# Patient Record
Sex: FEMALE | Race: WHITE | NOT HISPANIC OR LATINO
[De-identification: names, ages, dates, MRNs, and addresses within clinical notes are randomized per-mention and may not be internally consistent; named-entity substitution may affect disease eponyms.]

---

## 2018-02-06 ENCOUNTER — RESULT REVIEW (OUTPATIENT)
Age: 38
End: 2018-02-06

## 2018-02-16 ENCOUNTER — APPOINTMENT (OUTPATIENT)
Dept: MAMMOGRAPHY | Facility: CLINIC | Age: 38
End: 2018-02-16
Payer: COMMERCIAL

## 2018-02-16 ENCOUNTER — OUTPATIENT (OUTPATIENT)
Dept: OUTPATIENT SERVICES | Facility: HOSPITAL | Age: 38
LOS: 1 days | End: 2018-02-16
Payer: COMMERCIAL

## 2018-02-16 ENCOUNTER — APPOINTMENT (OUTPATIENT)
Dept: ULTRASOUND IMAGING | Facility: CLINIC | Age: 38
End: 2018-02-16
Payer: COMMERCIAL

## 2018-02-16 DIAGNOSIS — Z00.8 ENCOUNTER FOR OTHER GENERAL EXAMINATION: ICD-10-CM

## 2018-02-16 PROCEDURE — 77066 DX MAMMO INCL CAD BI: CPT | Mod: 26

## 2018-02-16 PROCEDURE — G0279: CPT | Mod: 26

## 2018-02-16 PROCEDURE — G0279: CPT

## 2018-02-16 PROCEDURE — 77066 DX MAMMO INCL CAD BI: CPT

## 2018-02-16 PROCEDURE — 76642 ULTRASOUND BREAST LIMITED: CPT | Mod: 26,RT

## 2018-02-16 PROCEDURE — 76642 ULTRASOUND BREAST LIMITED: CPT

## 2018-03-02 ENCOUNTER — OUTPATIENT (OUTPATIENT)
Dept: OUTPATIENT SERVICES | Facility: HOSPITAL | Age: 38
LOS: 1 days | End: 2018-03-02
Payer: COMMERCIAL

## 2018-03-02 ENCOUNTER — RESULT REVIEW (OUTPATIENT)
Age: 38
End: 2018-03-02

## 2018-03-02 ENCOUNTER — APPOINTMENT (OUTPATIENT)
Dept: ULTRASOUND IMAGING | Facility: CLINIC | Age: 38
End: 2018-03-02
Payer: COMMERCIAL

## 2018-03-02 DIAGNOSIS — Z00.8 ENCOUNTER FOR OTHER GENERAL EXAMINATION: ICD-10-CM

## 2018-03-02 PROCEDURE — 77065 DX MAMMO INCL CAD UNI: CPT | Mod: 26,RT

## 2018-03-02 PROCEDURE — 19083 BX BREAST 1ST LESION US IMAG: CPT

## 2018-03-02 PROCEDURE — 77065 DX MAMMO INCL CAD UNI: CPT

## 2018-03-02 PROCEDURE — A4648: CPT

## 2018-03-02 PROCEDURE — 19083 BX BREAST 1ST LESION US IMAG: CPT | Mod: RT

## 2018-09-13 ENCOUNTER — OUTPATIENT (OUTPATIENT)
Dept: OUTPATIENT SERVICES | Facility: HOSPITAL | Age: 38
LOS: 1 days | End: 2018-09-13
Payer: COMMERCIAL

## 2018-09-13 ENCOUNTER — APPOINTMENT (OUTPATIENT)
Dept: ULTRASOUND IMAGING | Facility: HOSPITAL | Age: 38
End: 2018-09-13

## 2018-09-13 DIAGNOSIS — Z00.8 ENCOUNTER FOR OTHER GENERAL EXAMINATION: ICD-10-CM

## 2018-09-13 PROCEDURE — 76642 ULTRASOUND BREAST LIMITED: CPT | Mod: 26,RT

## 2018-09-13 PROCEDURE — 76642 ULTRASOUND BREAST LIMITED: CPT

## 2019-02-07 ENCOUNTER — RESULT REVIEW (OUTPATIENT)
Age: 39
End: 2019-02-07

## 2019-03-07 ENCOUNTER — APPOINTMENT (OUTPATIENT)
Dept: ULTRASOUND IMAGING | Facility: CLINIC | Age: 39
End: 2019-03-07
Payer: COMMERCIAL

## 2019-03-07 ENCOUNTER — OUTPATIENT (OUTPATIENT)
Dept: OUTPATIENT SERVICES | Facility: HOSPITAL | Age: 39
LOS: 1 days | End: 2019-03-07
Payer: COMMERCIAL

## 2019-03-07 ENCOUNTER — APPOINTMENT (OUTPATIENT)
Dept: MAMMOGRAPHY | Facility: CLINIC | Age: 39
End: 2019-03-07
Payer: COMMERCIAL

## 2019-03-07 DIAGNOSIS — Z00.8 ENCOUNTER FOR OTHER GENERAL EXAMINATION: ICD-10-CM

## 2019-03-07 PROCEDURE — 77063 BREAST TOMOSYNTHESIS BI: CPT | Mod: 26

## 2019-03-07 PROCEDURE — 76641 ULTRASOUND BREAST COMPLETE: CPT | Mod: 26,50

## 2019-03-07 PROCEDURE — 77067 SCR MAMMO BI INCL CAD: CPT | Mod: 26

## 2019-03-07 PROCEDURE — 76641 ULTRASOUND BREAST COMPLETE: CPT

## 2019-03-07 PROCEDURE — 77063 BREAST TOMOSYNTHESIS BI: CPT

## 2019-03-07 PROCEDURE — 77067 SCR MAMMO BI INCL CAD: CPT

## 2019-11-07 ENCOUNTER — APPOINTMENT (OUTPATIENT)
Dept: MRI IMAGING | Facility: HOSPITAL | Age: 39
End: 2019-11-07
Payer: COMMERCIAL

## 2019-11-07 ENCOUNTER — OUTPATIENT (OUTPATIENT)
Dept: OUTPATIENT SERVICES | Facility: HOSPITAL | Age: 39
LOS: 1 days | End: 2019-11-07
Payer: COMMERCIAL

## 2019-11-07 DIAGNOSIS — Z00.8 ENCOUNTER FOR OTHER GENERAL EXAMINATION: ICD-10-CM

## 2019-11-07 PROCEDURE — 72148 MRI LUMBAR SPINE W/O DYE: CPT | Mod: 26

## 2019-11-07 PROCEDURE — 72148 MRI LUMBAR SPINE W/O DYE: CPT

## 2019-11-13 ENCOUNTER — APPOINTMENT (OUTPATIENT)
Dept: ORTHOPEDIC SURGERY | Facility: CLINIC | Age: 39
End: 2019-11-13
Payer: COMMERCIAL

## 2019-11-13 VITALS
SYSTOLIC BLOOD PRESSURE: 121 MMHG | WEIGHT: 148 LBS | HEART RATE: 96 BPM | HEIGHT: 66.5 IN | DIASTOLIC BLOOD PRESSURE: 86 MMHG | BODY MASS INDEX: 23.5 KG/M2

## 2019-11-13 DIAGNOSIS — Z78.9 OTHER SPECIFIED HEALTH STATUS: ICD-10-CM

## 2019-11-13 PROCEDURE — 99204 OFFICE O/P NEW MOD 45 MIN: CPT

## 2019-11-13 RX ORDER — CYCLOBENZAPRINE HYDROCHLORIDE, CAPSAICIN AND MENTHOL PATCH 10MG-.0375
10 & 0.0375-5 KIT MISCELLANEOUS
Refills: 0 | Status: ACTIVE | COMMUNITY

## 2019-11-13 RX ORDER — PREDNISONE 10 MG
TABLET ORAL
Refills: 0 | Status: ACTIVE | COMMUNITY

## 2019-11-19 ENCOUNTER — CHART COPY (OUTPATIENT)
Age: 39
End: 2019-11-19

## 2019-11-26 ENCOUNTER — OUTPATIENT (OUTPATIENT)
Dept: OUTPATIENT SERVICES | Facility: HOSPITAL | Age: 39
LOS: 1 days | End: 2019-11-26
Payer: COMMERCIAL

## 2019-11-26 DIAGNOSIS — M54.16 RADICULOPATHY, LUMBAR REGION: ICD-10-CM

## 2019-11-26 PROCEDURE — 62323 NJX INTERLAMINAR LMBR/SAC: CPT

## 2019-11-26 PROCEDURE — 77003 FLUOROGUIDE FOR SPINE INJECT: CPT

## 2019-12-02 ENCOUNTER — APPOINTMENT (OUTPATIENT)
Dept: ORTHOPEDIC SURGERY | Facility: CLINIC | Age: 39
End: 2019-12-02
Payer: COMMERCIAL

## 2019-12-02 VITALS — WEIGHT: 148 LBS | HEIGHT: 66.5 IN | BODY MASS INDEX: 23.5 KG/M2

## 2019-12-02 PROCEDURE — 99213 OFFICE O/P EST LOW 20 MIN: CPT

## 2020-01-25 ENCOUNTER — TRANSCRIPTION ENCOUNTER (OUTPATIENT)
Age: 40
End: 2020-01-25

## 2020-02-10 ENCOUNTER — RESULT REVIEW (OUTPATIENT)
Age: 40
End: 2020-02-10

## 2020-03-09 ENCOUNTER — APPOINTMENT (OUTPATIENT)
Dept: ORTHOPEDIC SURGERY | Facility: CLINIC | Age: 40
End: 2020-03-09
Payer: COMMERCIAL

## 2020-03-09 VITALS — HEIGHT: 66.5 IN | WEIGHT: 148 LBS | BODY MASS INDEX: 23.5 KG/M2

## 2020-03-09 PROCEDURE — 99213 OFFICE O/P EST LOW 20 MIN: CPT

## 2020-03-12 ENCOUNTER — APPOINTMENT (OUTPATIENT)
Dept: MAMMOGRAPHY | Facility: CLINIC | Age: 40
End: 2020-03-12
Payer: COMMERCIAL

## 2020-03-12 ENCOUNTER — APPOINTMENT (OUTPATIENT)
Dept: ULTRASOUND IMAGING | Facility: CLINIC | Age: 40
End: 2020-03-12
Payer: COMMERCIAL

## 2020-03-12 ENCOUNTER — OUTPATIENT (OUTPATIENT)
Dept: OUTPATIENT SERVICES | Facility: HOSPITAL | Age: 40
LOS: 1 days | End: 2020-03-12
Payer: COMMERCIAL

## 2020-03-12 DIAGNOSIS — Z00.8 ENCOUNTER FOR OTHER GENERAL EXAMINATION: ICD-10-CM

## 2020-03-12 PROCEDURE — 77063 BREAST TOMOSYNTHESIS BI: CPT | Mod: 26

## 2020-03-12 PROCEDURE — 76641 ULTRASOUND BREAST COMPLETE: CPT | Mod: 26,50

## 2020-03-12 PROCEDURE — 77067 SCR MAMMO BI INCL CAD: CPT

## 2020-03-12 PROCEDURE — 77063 BREAST TOMOSYNTHESIS BI: CPT

## 2020-03-12 PROCEDURE — 76641 ULTRASOUND BREAST COMPLETE: CPT

## 2020-03-12 PROCEDURE — 77067 SCR MAMMO BI INCL CAD: CPT | Mod: 26

## 2020-04-25 ENCOUNTER — MESSAGE (OUTPATIENT)
Age: 40
End: 2020-04-25

## 2020-05-04 LAB
SARS-COV-2 IGG SERPL IA-ACNC: 0.3 RATIO
SARS-COV-2 IGG SERPL QL IA: NEGATIVE

## 2020-07-28 ENCOUNTER — OUTPATIENT (OUTPATIENT)
Dept: OUTPATIENT SERVICES | Facility: HOSPITAL | Age: 40
LOS: 1 days | End: 2020-07-28
Payer: COMMERCIAL

## 2020-07-28 DIAGNOSIS — Z11.59 ENCOUNTER FOR SCREENING FOR OTHER VIRAL DISEASES: ICD-10-CM

## 2020-07-28 PROCEDURE — U0003: CPT

## 2020-07-29 LAB — SARS-COV-2 RNA SPEC QL NAA+PROBE: SIGNIFICANT CHANGE UP

## 2020-07-30 ENCOUNTER — OUTPATIENT (OUTPATIENT)
Dept: OUTPATIENT SERVICES | Facility: HOSPITAL | Age: 40
LOS: 1 days | End: 2020-07-30
Payer: COMMERCIAL

## 2020-07-30 DIAGNOSIS — M54.16 RADICULOPATHY, LUMBAR REGION: ICD-10-CM

## 2020-07-30 PROCEDURE — 62323 NJX INTERLAMINAR LMBR/SAC: CPT

## 2020-07-30 PROCEDURE — 77003 FLUOROGUIDE FOR SPINE INJECT: CPT

## 2020-08-17 ENCOUNTER — APPOINTMENT (OUTPATIENT)
Dept: ORTHOPEDIC SURGERY | Facility: CLINIC | Age: 40
End: 2020-08-17
Payer: COMMERCIAL

## 2020-08-17 VITALS — WEIGHT: 48 LBS | BODY MASS INDEX: 7.62 KG/M2 | TEMPERATURE: 97.4 F | HEIGHT: 66.5 IN

## 2020-08-17 DIAGNOSIS — M51.26 OTHER INTERVERTEBRAL DISC DISPLACEMENT, LUMBAR REGION: ICD-10-CM

## 2020-08-17 DIAGNOSIS — M48.061 SPINAL STENOSIS, LUMBAR REGION WITHOUT NEUROGENIC CLAUDICATION: ICD-10-CM

## 2020-08-17 PROCEDURE — 99214 OFFICE O/P EST MOD 30 MIN: CPT

## 2020-08-17 RX ORDER — CYCLOBENZAPRINE HYDROCHLORIDE 10 MG/1
10 TABLET, FILM COATED ORAL
Qty: 10 | Refills: 0 | Status: ACTIVE | COMMUNITY
Start: 2020-07-05

## 2020-08-17 RX ORDER — PREDNISONE 20 MG/1
20 TABLET ORAL
Qty: 5 | Refills: 0 | Status: ACTIVE | COMMUNITY
Start: 2020-07-05

## 2020-09-12 ENCOUNTER — OUTPATIENT (OUTPATIENT)
Dept: OUTPATIENT SERVICES | Facility: HOSPITAL | Age: 40
LOS: 1 days | End: 2020-09-12
Payer: COMMERCIAL

## 2020-09-12 DIAGNOSIS — Z11.59 ENCOUNTER FOR SCREENING FOR OTHER VIRAL DISEASES: ICD-10-CM

## 2020-09-12 LAB — SARS-COV-2 RNA SPEC QL NAA+PROBE: SIGNIFICANT CHANGE UP

## 2020-09-12 PROCEDURE — U0003: CPT

## 2020-09-14 ENCOUNTER — OUTPATIENT (OUTPATIENT)
Dept: OUTPATIENT SERVICES | Facility: HOSPITAL | Age: 40
LOS: 1 days | End: 2020-09-14
Payer: COMMERCIAL

## 2020-09-14 DIAGNOSIS — M54.16 RADICULOPATHY, LUMBAR REGION: ICD-10-CM

## 2020-09-14 PROCEDURE — 62323 NJX INTERLAMINAR LMBR/SAC: CPT

## 2020-09-14 PROCEDURE — 77003 FLUOROGUIDE FOR SPINE INJECT: CPT

## 2021-02-25 ENCOUNTER — RESULT REVIEW (OUTPATIENT)
Age: 41
End: 2021-02-25

## 2021-04-05 ENCOUNTER — APPOINTMENT (OUTPATIENT)
Dept: MRI IMAGING | Facility: HOSPITAL | Age: 41
End: 2021-04-05
Payer: COMMERCIAL

## 2021-04-05 ENCOUNTER — RESULT REVIEW (OUTPATIENT)
Age: 41
End: 2021-04-05

## 2021-04-05 ENCOUNTER — OUTPATIENT (OUTPATIENT)
Dept: OUTPATIENT SERVICES | Facility: HOSPITAL | Age: 41
LOS: 1 days | End: 2021-04-05
Payer: COMMERCIAL

## 2021-04-05 DIAGNOSIS — Z00.8 ENCOUNTER FOR OTHER GENERAL EXAMINATION: ICD-10-CM

## 2021-04-05 PROCEDURE — 72148 MRI LUMBAR SPINE W/O DYE: CPT

## 2021-04-05 PROCEDURE — 72148 MRI LUMBAR SPINE W/O DYE: CPT | Mod: 26

## 2021-04-12 ENCOUNTER — APPOINTMENT (OUTPATIENT)
Dept: MAMMOGRAPHY | Facility: CLINIC | Age: 41
End: 2021-04-12
Payer: COMMERCIAL

## 2021-04-12 ENCOUNTER — APPOINTMENT (OUTPATIENT)
Dept: ULTRASOUND IMAGING | Facility: CLINIC | Age: 41
End: 2021-04-12
Payer: COMMERCIAL

## 2021-04-12 ENCOUNTER — OUTPATIENT (OUTPATIENT)
Dept: OUTPATIENT SERVICES | Facility: HOSPITAL | Age: 41
LOS: 1 days | End: 2021-04-12
Payer: COMMERCIAL

## 2021-04-12 DIAGNOSIS — Z00.8 ENCOUNTER FOR OTHER GENERAL EXAMINATION: ICD-10-CM

## 2021-04-12 PROCEDURE — 77067 SCR MAMMO BI INCL CAD: CPT | Mod: 26

## 2021-04-12 PROCEDURE — 76641 ULTRASOUND BREAST COMPLETE: CPT

## 2021-04-12 PROCEDURE — 76641 ULTRASOUND BREAST COMPLETE: CPT | Mod: 26,50

## 2021-04-12 PROCEDURE — 77063 BREAST TOMOSYNTHESIS BI: CPT | Mod: 26

## 2021-04-12 PROCEDURE — 76830 TRANSVAGINAL US NON-OB: CPT

## 2021-04-12 PROCEDURE — 76856 US EXAM PELVIC COMPLETE: CPT | Mod: 26

## 2021-04-12 PROCEDURE — 77067 SCR MAMMO BI INCL CAD: CPT

## 2021-04-12 PROCEDURE — 76830 TRANSVAGINAL US NON-OB: CPT | Mod: 26

## 2021-04-12 PROCEDURE — 77063 BREAST TOMOSYNTHESIS BI: CPT

## 2021-04-12 PROCEDURE — 76856 US EXAM PELVIC COMPLETE: CPT

## 2022-01-01 ENCOUNTER — OUTPATIENT (OUTPATIENT)
Dept: OUTPATIENT SERVICES | Facility: HOSPITAL | Age: 42
LOS: 1 days | End: 2022-01-01
Payer: COMMERCIAL

## 2022-01-01 DIAGNOSIS — Z20.828 CONTACT WITH AND (SUSPECTED) EXPOSURE TO OTHER VIRAL COMMUNICABLE DISEASES: ICD-10-CM

## 2022-01-01 PROCEDURE — U0003: CPT

## 2022-01-01 PROCEDURE — U0005: CPT

## 2022-05-05 ENCOUNTER — RESULT REVIEW (OUTPATIENT)
Age: 42
End: 2022-05-05

## 2022-08-05 ENCOUNTER — OUTPATIENT (OUTPATIENT)
Dept: OUTPATIENT SERVICES | Facility: HOSPITAL | Age: 42
LOS: 1 days | End: 2022-08-05
Payer: COMMERCIAL

## 2022-08-05 ENCOUNTER — APPOINTMENT (OUTPATIENT)
Dept: ULTRASOUND IMAGING | Facility: CLINIC | Age: 42
End: 2022-08-05

## 2022-08-05 ENCOUNTER — APPOINTMENT (OUTPATIENT)
Dept: MAMMOGRAPHY | Facility: CLINIC | Age: 42
End: 2022-08-05

## 2022-08-05 DIAGNOSIS — Z00.8 ENCOUNTER FOR OTHER GENERAL EXAMINATION: ICD-10-CM

## 2022-08-05 PROCEDURE — 76641 ULTRASOUND BREAST COMPLETE: CPT

## 2022-08-05 PROCEDURE — 76641 ULTRASOUND BREAST COMPLETE: CPT | Mod: 26,50

## 2022-08-05 PROCEDURE — 77067 SCR MAMMO BI INCL CAD: CPT | Mod: 26

## 2022-08-05 PROCEDURE — 77067 SCR MAMMO BI INCL CAD: CPT

## 2022-08-05 PROCEDURE — 77063 BREAST TOMOSYNTHESIS BI: CPT

## 2022-08-05 PROCEDURE — 77063 BREAST TOMOSYNTHESIS BI: CPT | Mod: 26

## 2023-03-20 ENCOUNTER — APPOINTMENT (OUTPATIENT)
Dept: OBGYN | Facility: CLINIC | Age: 43
End: 2023-03-20
Payer: COMMERCIAL

## 2023-03-20 VITALS
DIASTOLIC BLOOD PRESSURE: 80 MMHG | BODY MASS INDEX: 24.62 KG/M2 | OXYGEN SATURATION: 99 % | WEIGHT: 155 LBS | SYSTOLIC BLOOD PRESSURE: 124 MMHG | TEMPERATURE: 97.3 F | HEIGHT: 66.5 IN | HEART RATE: 88 BPM

## 2023-03-20 DIAGNOSIS — Z02.82 ENCOUNTER FOR ADOPTION SERVICES: ICD-10-CM

## 2023-03-20 DIAGNOSIS — N60.02 SOLITARY CYST OF LEFT BREAST: ICD-10-CM

## 2023-03-20 LAB
HCG UR QL: NEGATIVE
QUALITY CONTROL: YES

## 2023-03-20 PROCEDURE — 99203 OFFICE O/P NEW LOW 30 MIN: CPT

## 2023-03-20 NOTE — PHYSICAL EXAM
[Chaperone Declined] : Patient declined chaperone [Appropriately responsive] : appropriately responsive [Alert] : alert [No Acute Distress] : no acute distress [No Lymphadenopathy] : no lymphadenopathy [FreeTextEntry6] : breast implants [Examination Of The Breasts] : a normal appearance [Breast Palpation Diffuse Fibrous Tissue Bilateral] : fibrocystic changes [] : implants [Normal] : normal [No Discharge] : no discharge [___cm] : a ~M [unfilled] ~Ucm mass was palpated deep to the nipple

## 2023-03-20 NOTE — HISTORY OF PRESENT ILLNESS
[Palpable Lump] : palpable lump [FreeTextEntry1] : 44 yo  LMP 23 present with a left breast lump since 2 weeks w/o pain [TextBox_17] :  lt periareolar 6'oclock

## 2023-03-21 ENCOUNTER — RESULT REVIEW (OUTPATIENT)
Age: 43
End: 2023-03-21

## 2023-03-21 ENCOUNTER — OUTPATIENT (OUTPATIENT)
Dept: OUTPATIENT SERVICES | Facility: HOSPITAL | Age: 43
LOS: 1 days | End: 2023-03-21
Payer: COMMERCIAL

## 2023-03-21 ENCOUNTER — APPOINTMENT (OUTPATIENT)
Dept: MAMMOGRAPHY | Facility: HOSPITAL | Age: 43
End: 2023-03-21
Payer: COMMERCIAL

## 2023-03-21 ENCOUNTER — APPOINTMENT (OUTPATIENT)
Dept: ULTRASOUND IMAGING | Facility: HOSPITAL | Age: 43
End: 2023-03-21
Payer: COMMERCIAL

## 2023-03-21 DIAGNOSIS — N60.02 SOLITARY CYST OF LEFT BREAST: ICD-10-CM

## 2023-03-21 PROCEDURE — G0279: CPT | Mod: 26

## 2023-03-21 PROCEDURE — 76641 ULTRASOUND BREAST COMPLETE: CPT | Mod: 26,LT

## 2023-03-21 PROCEDURE — 77065 DX MAMMO INCL CAD UNI: CPT

## 2023-03-21 PROCEDURE — 76641 ULTRASOUND BREAST COMPLETE: CPT

## 2023-03-21 PROCEDURE — 77065 DX MAMMO INCL CAD UNI: CPT | Mod: 26,LT

## 2023-03-21 PROCEDURE — G0279: CPT

## 2023-06-02 ENCOUNTER — RESULT CHARGE (OUTPATIENT)
Age: 43
End: 2023-06-02

## 2023-06-02 ENCOUNTER — APPOINTMENT (OUTPATIENT)
Dept: OBGYN | Facility: CLINIC | Age: 43
End: 2023-06-02
Payer: COMMERCIAL

## 2023-06-02 VITALS
HEART RATE: 78 BPM | OXYGEN SATURATION: 99 % | SYSTOLIC BLOOD PRESSURE: 110 MMHG | HEIGHT: 66.5 IN | TEMPERATURE: 97.6 F | WEIGHT: 155 LBS | DIASTOLIC BLOOD PRESSURE: 60 MMHG | BODY MASS INDEX: 24.62 KG/M2

## 2023-06-02 DIAGNOSIS — N76.0 ACUTE VAGINITIS: ICD-10-CM

## 2023-06-02 DIAGNOSIS — R39.9 UNSPECIFIED SYMPTOMS AND SIGNS INVOLVING THE GENITOURINARY SYSTEM: ICD-10-CM

## 2023-06-02 LAB
BILIRUB UR QL STRIP: NORMAL
CLARITY UR: CLEAR
COLLECTION METHOD: NORMAL
GLUCOSE UR-MCNC: NORMAL
HCG UR QL: 0.2 EU/DL
HCG UR QL: NEGATIVE
HGB UR QL STRIP.AUTO: NORMAL
KETONES UR-MCNC: NORMAL
LEUKOCYTE ESTERASE UR QL STRIP: NORMAL
NITRITE UR QL STRIP: NORMAL
PH UR STRIP: 7.5
PROT UR STRIP-MCNC: NORMAL
QUALITY CONTROL: YES
SP GR UR STRIP: 1.01

## 2023-06-02 PROCEDURE — 81003 URINALYSIS AUTO W/O SCOPE: CPT | Mod: QW

## 2023-06-02 PROCEDURE — 99213 OFFICE O/P EST LOW 20 MIN: CPT

## 2023-06-02 PROCEDURE — 81025 URINE PREGNANCY TEST: CPT

## 2023-06-02 RX ORDER — FLUCONAZOLE 150 MG/1
150 TABLET ORAL
Qty: 3 | Refills: 0 | Status: ACTIVE | COMMUNITY
Start: 2023-06-02 | End: 1900-01-01

## 2023-06-02 NOTE — PHYSICAL EXAM
[Chaperone Present] : A chaperone was present in the examining room during all aspects of the physical examination [Appropriately responsive] : appropriately responsive [Alert] : alert [No Acute Distress] : no acute distress [Soft] : soft [Non-tender] : non-tender [Non-distended] : non-distended [No HSM] : No HSM [No Lesions] : no lesions [No Mass] : no mass [Oriented x3] : oriented x3 [Labia Majora] : normal [Labia Minora] : normal [Discharge] : a  ~M vaginal discharge was present [Moderate] : moderate [White] : white [Cheesy] : cheesy [Normal] : normal [Uterine Adnexae] : normal

## 2023-06-02 NOTE — DISCUSSION/SUMMARY
[FreeTextEntry1] : I spent the time noted on the day of this patient encounter preparing for, providing and documenting the above service. I have  counseled and educated the patient on the differential, workup, disease course, and treatment/management plan. Education was provided to the patient during this encounter. All questions and concerns were answered and addressed in detail.\par \par Myra Dean NP, FNP-BC

## 2023-06-02 NOTE — PLAN
[FreeTextEntry1] : Vaginal and Urine culture collected.\par \par Urine dip is negative but will send out for culture. \par \par Pt will start fluconazole based on s/s of yeast infection \par \par

## 2023-06-02 NOTE — HISTORY OF PRESENT ILLNESS
[FreeTextEntry1] : Pt is a 43 year old who presents with vaginal itchiness since Monday, May 29. Pt verbalizes for three days May 29-May 31 she was also having urinary discomfort and incomplete emptying which has resolved. Pt denies vaginal odor, fever or back pain.

## 2023-06-05 LAB — BACTERIA UR CULT: NORMAL

## 2023-06-07 ENCOUNTER — NON-APPOINTMENT (OUTPATIENT)
Age: 43
End: 2023-06-07

## 2023-08-30 ENCOUNTER — TRANSCRIPTION ENCOUNTER (OUTPATIENT)
Age: 43
End: 2023-08-30

## 2023-08-30 ENCOUNTER — APPOINTMENT (OUTPATIENT)
Dept: OBGYN | Facility: CLINIC | Age: 43
End: 2023-08-30
Payer: COMMERCIAL

## 2023-08-30 VITALS
WEIGHT: 149 LBS | OXYGEN SATURATION: 99 % | DIASTOLIC BLOOD PRESSURE: 68 MMHG | HEART RATE: 80 BPM | HEIGHT: 66 IN | TEMPERATURE: 97.2 F | BODY MASS INDEX: 23.95 KG/M2 | SYSTOLIC BLOOD PRESSURE: 114 MMHG

## 2023-08-30 DIAGNOSIS — Z12.4 ENCOUNTER FOR SCREENING FOR MALIGNANT NEOPLASM OF CERVIX: ICD-10-CM

## 2023-08-30 LAB
HCG UR QL: NEGATIVE
QUALITY CONTROL: YES

## 2023-08-30 PROCEDURE — 99396 PREV VISIT EST AGE 40-64: CPT

## 2023-08-31 LAB — HPV HIGH+LOW RISK DNA PNL CVX: NOT DETECTED

## 2023-09-05 NOTE — HISTORY OF PRESENT ILLNESS
[No] : Patient does not have concerns regarding sex [Currently Active] : currently active [FreeTextEntry1] : 44 yo  LMP 23 presents annual exam w/o complaints [Mammogramdate] : 09/22 [BreastSonogramDate] : 09/22 [ColonoscopyDate] : 2021

## 2023-09-05 NOTE — PHYSICAL EXAM
[Chaperone Declined] : Patient declined chaperone [Appropriately responsive] : appropriately responsive [Alert] : alert [No Acute Distress] : no acute distress [No Lymphadenopathy] : no lymphadenopathy [Soft] : soft [Non-tender] : non-tender [Oriented x3] : oriented x3 [FreeTextEntry6] : scar [Examination Of The Breasts] : a normal appearance [] : implants [No Masses] : no breast masses were palpable [Labia Majora] : normal [Labia Minora] : normal [No Bleeding] : There was no active vaginal bleeding [Normal] : normal [Uterine Adnexae] : normal

## 2023-09-05 NOTE — COUNSELING
[Nutrition/ Exercise/ Weight Management] : nutrition, exercise, weight management [Breast Self Exam] : breast self exam [Vitamins/Supplements] : vitamins/supplements [Bladder Hygiene] : bladder hygiene

## 2023-09-06 LAB
CANDIDA VAG CYTO: NOT DETECTED
CYTOLOGY CVX/VAG DOC THIN PREP: NORMAL
G VAGINALIS+PREV SP MTYP VAG QL MICRO: NOT DETECTED
HPV 16 E6+E7 MRNA CVX QL NAA+PROBE: NOT DETECTED
HPV18+45 E6+E7 MRNA CVX QL NAA+PROBE: NOT DETECTED
T VAGINALIS VAG QL WET PREP: NOT DETECTED

## 2023-09-19 ENCOUNTER — RESULT REVIEW (OUTPATIENT)
Age: 43
End: 2023-09-19

## 2023-09-19 ENCOUNTER — APPOINTMENT (OUTPATIENT)
Dept: ULTRASOUND IMAGING | Facility: HOSPITAL | Age: 43
End: 2023-09-19
Payer: COMMERCIAL

## 2023-09-19 ENCOUNTER — OUTPATIENT (OUTPATIENT)
Dept: OUTPATIENT SERVICES | Facility: HOSPITAL | Age: 43
LOS: 1 days | End: 2023-09-19
Payer: COMMERCIAL

## 2023-09-19 ENCOUNTER — APPOINTMENT (OUTPATIENT)
Dept: MAMMOGRAPHY | Facility: HOSPITAL | Age: 43
End: 2023-09-19
Payer: COMMERCIAL

## 2023-09-19 DIAGNOSIS — Z12.39 ENCOUNTER FOR OTHER SCREENING FOR MALIGNANT NEOPLASM OF BREAST: ICD-10-CM

## 2023-09-19 PROCEDURE — 77063 BREAST TOMOSYNTHESIS BI: CPT | Mod: 26

## 2023-09-19 PROCEDURE — 77067 SCR MAMMO BI INCL CAD: CPT | Mod: 26

## 2023-09-19 PROCEDURE — 76641 ULTRASOUND BREAST COMPLETE: CPT | Mod: 26,50

## 2023-09-19 PROCEDURE — 77063 BREAST TOMOSYNTHESIS BI: CPT

## 2023-09-19 PROCEDURE — 77067 SCR MAMMO BI INCL CAD: CPT

## 2023-09-19 PROCEDURE — 76641 ULTRASOUND BREAST COMPLETE: CPT

## 2024-06-24 ENCOUNTER — NON-APPOINTMENT (OUTPATIENT)
Age: 44
End: 2024-06-24

## 2024-06-27 ENCOUNTER — APPOINTMENT (OUTPATIENT)
Dept: OBGYN | Facility: CLINIC | Age: 44
End: 2024-06-27
Payer: COMMERCIAL

## 2024-06-27 VITALS
OXYGEN SATURATION: 98 % | DIASTOLIC BLOOD PRESSURE: 72 MMHG | SYSTOLIC BLOOD PRESSURE: 116 MMHG | HEART RATE: 83 BPM | HEIGHT: 66 IN | WEIGHT: 158 LBS | BODY MASS INDEX: 25.39 KG/M2 | TEMPERATURE: 97.9 F

## 2024-06-27 DIAGNOSIS — Z01.419 ENCOUNTER FOR GYNECOLOGICAL EXAMINATION (GENERAL) (ROUTINE) W/OUT ABNORMAL FINDINGS: ICD-10-CM

## 2024-06-27 DIAGNOSIS — N92.0 EXCESSIVE AND FREQUENT MENSTRUATION WITH REGULAR CYCLE: ICD-10-CM

## 2024-06-27 DIAGNOSIS — N95.1 MENOPAUSAL AND FEMALE CLIMACTERIC STATES: ICD-10-CM

## 2024-06-27 DIAGNOSIS — N60.12 DIFFUSE CYSTIC MASTOPATHY OF RIGHT BREAST: ICD-10-CM

## 2024-06-27 DIAGNOSIS — Z12.39 ENCOUNTER FOR OTHER SCREENING FOR MALIGNANT NEOPLASM OF BREAST: ICD-10-CM

## 2024-06-27 DIAGNOSIS — N60.11 DIFFUSE CYSTIC MASTOPATHY OF RIGHT BREAST: ICD-10-CM

## 2024-06-27 DIAGNOSIS — F41.1 GENERALIZED ANXIETY DISORDER: ICD-10-CM

## 2024-06-27 LAB
HCG UR QL: NEGATIVE
QUALITY CONTROL: YES

## 2024-06-27 PROCEDURE — 99396 PREV VISIT EST AGE 40-64: CPT

## 2024-06-27 PROCEDURE — 99213 OFFICE O/P EST LOW 20 MIN: CPT | Mod: 25

## 2024-06-29 LAB
HPV 16 E6+E7 MRNA CVX QL NAA+PROBE: NOT DETECTED
HPV HIGH+LOW RISK DNA PNL CVX: NOT DETECTED
HPV18+45 E6+E7 MRNA CVX QL NAA+PROBE: NOT DETECTED

## 2024-07-11 ENCOUNTER — APPOINTMENT (OUTPATIENT)
Dept: OBGYN | Facility: CLINIC | Age: 44
End: 2024-07-11
Payer: COMMERCIAL

## 2024-07-11 VITALS
TEMPERATURE: 97.6 F | WEIGHT: 158 LBS | BODY MASS INDEX: 25.39 KG/M2 | DIASTOLIC BLOOD PRESSURE: 70 MMHG | HEART RATE: 85 BPM | HEIGHT: 66 IN | SYSTOLIC BLOOD PRESSURE: 120 MMHG | OXYGEN SATURATION: 98 %

## 2024-07-11 DIAGNOSIS — F41.1 GENERALIZED ANXIETY DISORDER: ICD-10-CM

## 2024-07-11 DIAGNOSIS — N95.1 MENOPAUSAL AND FEMALE CLIMACTERIC STATES: ICD-10-CM

## 2024-07-11 PROCEDURE — 99213 OFFICE O/P EST LOW 20 MIN: CPT

## 2024-07-15 PROBLEM — N95.1 PERIMENOPAUSAL SYMPTOMS: Status: ACTIVE | Noted: 2024-07-15

## 2024-07-30 ENCOUNTER — APPOINTMENT (OUTPATIENT)
Dept: PSYCHIATRY | Facility: CLINIC | Age: 44
End: 2024-07-30
Payer: COMMERCIAL

## 2024-07-30 PROCEDURE — 99205 OFFICE O/P NEW HI 60 MIN: CPT

## 2024-07-30 PROCEDURE — G2211 COMPLEX E/M VISIT ADD ON: CPT

## 2024-07-30 NOTE — PHYSICAL EXAM
[None] : none [Average] : average [Cooperative] : cooperative [Euthymic] : euthymic [Full] : full [Clear] : clear [Linear/Goal Directed] : linear/goal directed [WNL] : within normal limits [FreeTextEntry8] : "calmer"

## 2024-07-30 NOTE — DISCUSSION/SUMMARY
[Low acute suicide risk] : Low acute suicide risk [No] : No [Not clinically indicated] : Safety Plan completed/updated (for individuals at risk): Not clinically indicated [FreeTextEntry1] : 7/30/24- Increase escitalopram to 15 mg daily. Refer to psychotherapy. F.u in 2 months or sooner if needed.

## 2024-07-30 NOTE — HISTORY OF PRESENT ILLNESS
[FreeTextEntry1] : 43 yo F with history of LELA presenting for intake evaluation seeking psych med management.   CATERINA started her on escitalopram about a month ago for anxiety. Per patient, she has always been a perfectionist and worrier to some extent but that this has gotten worse over the years. Feels like it is impacted her family at this point which is what prompted her to consider medication.  Patient described that as soon as her eyes open in the morning, she immediately begins worrying about what she has to do during the day. Reports worrying about many "little things" and is a "glass half-empty thinker". Worries about the worst-case scenario, sometimes takes steps to try to prevent a worst-case scenario, for example going back to check locks or setting numerous alarms for herself in the morning in case she sleeps through them. Recently feels she has been unable to relax, rest due to how much time and energy she spends worrying. Finds herself crying and upset at times due to feeling overwhelmed by the anxiety. Reports that her weight is one thing in particular she becomes "hyper-focused" on. Has been preoccupied with making sure she food-preps to the point that it can be at the expense of cooking for the rest of the family. Describes constantly worrying about the number on the scale, what size she needs when clothes shopping. Feels like recently this has "consumed" her. Family has complained about how she has been "very down on [herself] about the scale" and does not understand why she views herself in the way she does. At work she struggles to delegate tasks, often prefers to just do them herself. Describes herself as a rule follower and that she would "report herself" if she made a mistake.    She denies history of panic attacks.  Denies somatic symptoms a/w anxiety.  Since starting escitalopram, mood has been "much calmer". Reports that prior to starting it, she was "not super happy", was "on the hamster wheel", trying to "get through the week". Initially after she started the escitalopram, she experienced sedation during the day however this resolved after moving the dose to qHS. No other side effects. Denied any current issues with sleep- said if anything she has been able to sleep a little bit later because she is not waking up with racing thoughts anymore. Exercises regularly, more recently has been doing yoga and Pilates to help with relaxation. No changes w/ appetite. Denied feelings of worthlessness/hopelessness, denied SI. Denied amotivation, denied anhedonia.   Psych Review of Systems: Denied history of periods of abnormally/persistently elevated or irritable mood associated with changes in activity, energy, sleep or behavior. Denied history of panic attacks and agoraphobia.  Denied history of persistent patterns and impairment in functioning due to inattention, hyperactivity and impulsivity. Denied history of auditory and visual hallucinations. Denied history of paranoia, delusions to their knowledge. Denied history of exposure to trauma and subsequent intrusive symptoms, changes in mood/cognition and alterations in arousal or reactivity.                [FreeTextEntry2] : -Past or Current Mental Health Providers: ---Psychopharm: OBGYN prescribed escitalopram about a month ago. Has never seen psychiatrist before.  ---Psychotherapy: denied  -Historical Diagnoses: LELA -Prior Psychiatric Hospitalizations/ED Evaluations: denied -Past Suicide Attempts: denied -History of self-injurious behavior: denied -History of assaultive or violent behavior: denied   Substance Use History: -Nicotine: former smoker, quit in 2001. Smoked for 7 years, 1-2 PPD.  -Alcohol: Socially, denied history of problematic drinking -Cannabis: denied  -Caffeine: coffee- 1-2 cups per day -Other Illicit drugs: denied  -Medical/social/legal problems related to substance use: denied -Prior Substance Use Tx: denied    [FreeTextEntry3] : Current psych meds: --escitalopram 10 mg daily   Past psych meds: --denied   PDMP reviewed, ref# [id number].

## 2024-07-30 NOTE — PAST MEDICAL HISTORY
[FreeTextEntry1] : PCP: Dr. Deon Ford ([facility])  Hx of TBI: denied  Hx of Seizures: denied  Hx of Migraine HA: yes- more frequently when she was younger, currently <1 year   Relevant Labs/EKG: none in chart for review

## 2024-07-30 NOTE — SOCIAL HISTORY
[FreeTextEntry1] : -[address]: Grew up in [place], [place] -Current living situation: Living in [place], [place] with  and two children -Parents/Siblings/Childhood: Adopted at birth (closed). As a child lived with adoptive parents ()- father worked as a , mom stayed at home with her. No siblings. 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 -Access to firearms: patient denies.

## 2024-08-08 ENCOUNTER — APPOINTMENT (OUTPATIENT)
Dept: PSYCHIATRY | Facility: CLINIC | Age: 44
End: 2024-08-08

## 2024-08-08 PROBLEM — F32.A DEPRESSIVE DISORDER: Status: ACTIVE | Noted: 2024-08-08

## 2024-08-08 PROCEDURE — 90791 PSYCH DIAGNOSTIC EVALUATION: CPT

## 2024-08-08 NOTE — REASON FOR VISIT
[Specialty Physician] : Specialty Physician [[facility] Provider/Facility] : [facility] Provider/Facility [Patient] : Patient [FreeTextEntry2] : Anxiety and mood swings. [FreeTextEntry1] : Patient feels anxious and has a pessimistic mind set.

## 2024-08-08 NOTE — HISTORY OF PRESENT ILLNESS
[FreeTextEntry1] : Patient is a 44-year-old  female who lives with her . She sought help after seeing Dr. Vega for negative thought patterns, thinking "worst case scenarios". Her  observed patient's anxiety has worsened. Patient worries about her weight and prepares her food for work daily. Patient feels stressed due to taking care of her parents. Her mother has multiple health issues. Patient's work schedule changes weekly, and she has different days off weekly. Patient does not feel rested after her days off. Patient tries to keep the household happy. She does not have much time for self-care.  Substance use history:  social drinking in the past. None since being prescribed Lexapro Education: BA in Respiratory Therapy at [employer] Work history: 24 years at [employer] Patient lives with her  and two children. Her son attends [employer] Marital status:  since 2001 Children: one daughter 21 years old. one son 19 years old.  Financial status: stable    [FreeTextEntry3] : Lexapro 10 mg tablet 1.5 daily [FreeTextEntry2] : Patient is treated by Dr. Vega for anxiety. Dr. Vega referred patient to therapy to help with negative thought patterns.  Patient denied history of suicide attempts or self-harm behavior.

## 2024-08-08 NOTE — DISCUSSION/SUMMARY
[Low acute suicide risk] : Low acute suicide risk [No] : No [Not clinically indicated] : Safety Plan completed/updated (for individuals at risk): Not clinically indicated [FreeTextEntry1] : Patient is a 44-year-old   female who was referred by Dr. Vega for help with negative thinking. Patient denied any history of suicidal ideation or plan. Patient denied any history of violence, auditory or visual hallucinations. Patient scored 6 on the PHQ-9 which indicated mild depression. Patient reported it is somewhat difficult to perform tasks. Patient scored 4 on the LELA-7 which indicated minimal anxiety and somewhat difficult to perform tasks. Patient stated that her score would have been much higher before she started taking the Lexapro last month.  Patient meets the criteria for Generalized Anxiety disorder and depression. Patient will have weekly sessions.

## 2024-08-08 NOTE — PSYCHOSOCIAL ASSESSMENT
[_____] : Last Use: [unfilled]  [FreeTextEntry2] : Patient has been  since 2001. Patient's spouse helps with cleaning and laundry. Patient does the grocery shopping and cooking for the most part. Patient cares for her elderly parents, who live down the block. Patient is an only child. All of the responsibilities fall on patient toward her parents. Patient enjoys reading and baking.  [FreeTextEntry3] : Patient has a rotating work schedule. She has her schedule for the month. It varies each week. Patient feels overwhelmed and anxious.  [FreeTextEntry1] : Patient is Roman Catholic. She does not consider herself Orthodox. Patient attended Roman [employer] school and celebrates the holidays. Patient believes in God.  [Competitive and integrated employment] : Competitive and integrated employment [35 hours or more] : 35 hours or more [Financially stable] : financially stable [Client's spouse or domestic partner] : client's spouse or domestic partner [Yes] : yes [No] : Prior or current active [place]  service? No

## 2024-08-08 NOTE — PLAN
[Every ___ week(s)] : Psychotherapy: Every [unfilled] week(s) [FreeTextEntry4] : Patient will develop a therapeutic relationship Patient will understand the triggers for anxiety Patient will increase her coping strategies and self-care

## 2024-08-08 NOTE — PHYSICAL EXAM
[Average] : average [Cooperative] : cooperative [Anxious] : anxious [Clear] : clear [Full] : full [Linear/Goal Directed] : linear/goal directed [WNL] : within normal limits [FreeTextEntry7] : No SI or HI

## 2024-08-08 NOTE — RISK ASSESSMENT
[Clinical Interview] : Clinical Interview [No] : No [Responsibility to children, family, or others] : responsibility to children, family, or others [Engaged in work or school] : engaged in work or school [None in the patient's lifetime] : None in the patient's lifetime [Yes] : yes [de-identified] : Patient denied access to guns.

## 2024-08-08 NOTE — FAMILY HISTORY
[FreeTextEntry1] : Patient was adopted by her parents, and an only child. Patient was born and raised in [place]. Her parents live down the block. They are elderly and have medical issues. Patient is the only caretaker to her parents. Patient described her parents as overprotective. She had a curfew while she was engaged. Patient was allowed to ride a bike as an older child. She was not allowed to attend concerts. Patient denied family history of suicide.

## 2024-08-14 ENCOUNTER — APPOINTMENT (OUTPATIENT)
Dept: PSYCHIATRY | Facility: CLINIC | Age: 44
End: 2024-08-14

## 2024-08-22 ENCOUNTER — APPOINTMENT (OUTPATIENT)
Dept: PSYCHIATRY | Facility: CLINIC | Age: 44
End: 2024-08-22
Payer: COMMERCIAL

## 2024-08-22 DIAGNOSIS — F32.A DEPRESSION, UNSPECIFIED: ICD-10-CM

## 2024-08-22 DIAGNOSIS — F41.1 GENERALIZED ANXIETY DISORDER: ICD-10-CM

## 2024-08-22 PROCEDURE — 90834 PSYTX W PT 45 MINUTES: CPT

## 2024-08-22 NOTE — PLAN
[Cognitive and/or Behavior Therapy] : Cognitive and/or Behavior Therapy  [Skills training (all types)] : Skills training (all types)  [Supportive Therapy] : Supportive Therapy

## 2024-08-22 NOTE — DISCUSSION/SUMMARY
[FreeTextEntry1] : Patient was in an anxious mood with a full affect. Patient feels overwhelmed with work and family obligations. Patient was receptive to the suggestion of meditating. Patient plans to get a massage next week. Patient is taking a trip to her son's college this weekend. Then, she is going with her family to a wedding out of town. No SI/HI/plan. No A/V hallucinations. Patient will contact therapist when she knows her work schedule for September. She was unable to schedule her next appointment today. Patient does not have consistent days off each week.

## 2024-09-11 ENCOUNTER — APPOINTMENT (OUTPATIENT)
Dept: PSYCHIATRY | Facility: CLINIC | Age: 44
End: 2024-09-11
Payer: COMMERCIAL

## 2024-09-11 PROCEDURE — 90837 PSYTX W PT 60 MINUTES: CPT

## 2024-09-11 NOTE — PHYSICAL EXAM
[Average] : average [Cooperative] : cooperative [Anxious] : anxious [Full] : full [Clear] : clear [Linear/Goal Directed] : linear/goal directed [WNL] : within normal limits [FreeTextEntry7] : No SI or HI [de-identified] : No auditory or visual hallucinations

## 2024-09-11 NOTE — PLAN
[Every ___ week(s)] : Psychotherapy: Every [unfilled] week(s) [Cognitive and/or Behavior Therapy] : Cognitive and/or Behavior Therapy  [Skills training (all types)] : Skills training (all types)  [Supportive Therapy] : Supportive Therapy [Other: ____] : [unfilled] [FreeTextEntry4] : Patient will develop a therapeutic relationship Patient will understand the triggers for anxiety Patient will increase her coping strategies and self-care

## 2024-09-11 NOTE — DISCUSSION/SUMMARY
[FreeTextEntry1] : Patient was in an anxious mood with a full affect. Patient was well engaged in the session. Patient spoke about her difficulty putting her needs first. Therapist explored and encouraged patient to do more self-care activities. Patient was receptive to this. Patient spoke about her relationship with her family and co-workers. No SI/HI/plan. No auditory or visual hallucinations. Patient will return on 9/19 at 10 am. She does not know her schedule yet for next month. Her schedule varies weekly.

## 2024-09-19 ENCOUNTER — APPOINTMENT (OUTPATIENT)
Dept: PSYCHIATRY | Facility: CLINIC | Age: 44
End: 2024-09-19
Payer: COMMERCIAL

## 2024-09-19 DIAGNOSIS — F32.A DEPRESSION, UNSPECIFIED: ICD-10-CM

## 2024-09-19 DIAGNOSIS — F41.1 GENERALIZED ANXIETY DISORDER: ICD-10-CM

## 2024-09-19 PROCEDURE — 90834 PSYTX W PT 45 MINUTES: CPT

## 2024-09-19 NOTE — PHYSICAL EXAM
[Average] : average [Cooperative] : cooperative [Anxious] : anxious [Full] : full [Clear] : clear [Linear/Goal Directed] : linear/goal directed [WNL] : within normal limits [FreeTextEntry7] : No SI or HI [de-identified] : No auditory or visual hallucinations

## 2024-09-19 NOTE — PLAN
[Cognitive and/or Behavior Therapy] : Cognitive and/or Behavior Therapy  [Other: ____] : [unfilled] [Every ___ week(s)] : Psychotherapy: Every [unfilled] week(s) [FreeTextEntry4] : Patient will develop a therapeutic relationship Patient will understand the triggers for anxiety Patient will increase her coping strategies and self-care

## 2024-09-19 NOTE — DISCUSSION/SUMMARY
[FreeTextEntry1] : Patient was in an anxious mood with a full affect. Patient verbalized feeling this was a good week. She is looking forward to going on vacation next week. Patient is working on not feeling responsible for her family's happiness. Therapist guided patient through the boxed breathing exercise, and the "4,7,8 breathing exercise. Therapist also guided patient through the "5,4,3,2,1 Grounding exercise". Patient was receptive to do these grounding exercises. No SI/HI/plan. No auditory or visual hallucinations. Patient will contact therapist when she has her new schedule.

## 2024-09-20 ENCOUNTER — APPOINTMENT (OUTPATIENT)
Dept: ULTRASOUND IMAGING | Facility: HOSPITAL | Age: 44
End: 2024-09-20
Payer: COMMERCIAL

## 2024-09-20 ENCOUNTER — APPOINTMENT (OUTPATIENT)
Dept: MAMMOGRAPHY | Facility: HOSPITAL | Age: 44
End: 2024-09-20
Payer: COMMERCIAL

## 2024-09-20 ENCOUNTER — OUTPATIENT (OUTPATIENT)
Dept: OUTPATIENT SERVICES | Facility: HOSPITAL | Age: 44
LOS: 1 days | End: 2024-09-20
Payer: COMMERCIAL

## 2024-09-20 ENCOUNTER — RESULT REVIEW (OUTPATIENT)
Age: 44
End: 2024-09-20

## 2024-09-20 DIAGNOSIS — Z12.39 ENCOUNTER FOR OTHER SCREENING FOR MALIGNANT NEOPLASM OF BREAST: ICD-10-CM

## 2024-09-20 PROCEDURE — 76830 TRANSVAGINAL US NON-OB: CPT

## 2024-09-20 PROCEDURE — 76856 US EXAM PELVIC COMPLETE: CPT | Mod: 26

## 2024-09-20 PROCEDURE — 76830 TRANSVAGINAL US NON-OB: CPT | Mod: 26

## 2024-09-20 PROCEDURE — 77067 SCR MAMMO BI INCL CAD: CPT | Mod: 26

## 2024-09-20 PROCEDURE — 76641 ULTRASOUND BREAST COMPLETE: CPT | Mod: 26,50

## 2024-09-20 PROCEDURE — 77067 SCR MAMMO BI INCL CAD: CPT

## 2024-09-20 PROCEDURE — 77063 BREAST TOMOSYNTHESIS BI: CPT

## 2024-09-20 PROCEDURE — 76856 US EXAM PELVIC COMPLETE: CPT

## 2024-09-20 PROCEDURE — 77063 BREAST TOMOSYNTHESIS BI: CPT | Mod: 26

## 2024-09-20 PROCEDURE — 76641 ULTRASOUND BREAST COMPLETE: CPT

## 2024-10-22 ENCOUNTER — APPOINTMENT (OUTPATIENT)
Dept: PSYCHIATRY | Facility: CLINIC | Age: 44
End: 2024-10-22
Payer: COMMERCIAL

## 2024-10-22 DIAGNOSIS — F41.1 GENERALIZED ANXIETY DISORDER: ICD-10-CM

## 2024-10-22 PROCEDURE — G2211 COMPLEX E/M VISIT ADD ON: CPT

## 2024-10-22 PROCEDURE — 99214 OFFICE O/P EST MOD 30 MIN: CPT

## 2024-10-25 ENCOUNTER — TRANSCRIPTION ENCOUNTER (OUTPATIENT)
Age: 44
End: 2024-10-25

## 2024-10-30 ENCOUNTER — TRANSCRIPTION ENCOUNTER (OUTPATIENT)
Age: 44
End: 2024-10-30

## 2024-11-06 ENCOUNTER — TRANSCRIPTION ENCOUNTER (OUTPATIENT)
Age: 44
End: 2024-11-06

## 2024-12-06 ENCOUNTER — TRANSCRIPTION ENCOUNTER (OUTPATIENT)
Age: 44
End: 2024-12-06

## 2024-12-17 ENCOUNTER — APPOINTMENT (OUTPATIENT)
Dept: PSYCHIATRY | Facility: CLINIC | Age: 44
End: 2024-12-17
Payer: COMMERCIAL

## 2024-12-17 ENCOUNTER — TRANSCRIPTION ENCOUNTER (OUTPATIENT)
Age: 44
End: 2024-12-17

## 2024-12-17 DIAGNOSIS — F41.1 GENERALIZED ANXIETY DISORDER: ICD-10-CM

## 2024-12-17 PROCEDURE — G2211 COMPLEX E/M VISIT ADD ON: CPT

## 2024-12-17 PROCEDURE — 99213 OFFICE O/P EST LOW 20 MIN: CPT

## 2025-01-15 ENCOUNTER — TRANSCRIPTION ENCOUNTER (OUTPATIENT)
Age: 45
End: 2025-01-15

## 2025-02-12 ENCOUNTER — TRANSCRIPTION ENCOUNTER (OUTPATIENT)
Age: 45
End: 2025-02-12

## 2025-03-18 ENCOUNTER — APPOINTMENT (OUTPATIENT)
Dept: PSYCHIATRY | Facility: CLINIC | Age: 45
End: 2025-03-18
Payer: COMMERCIAL

## 2025-03-18 DIAGNOSIS — F41.1 GENERALIZED ANXIETY DISORDER: ICD-10-CM

## 2025-03-18 PROCEDURE — 99214 OFFICE O/P EST MOD 30 MIN: CPT

## 2025-05-02 ENCOUNTER — TRANSCRIPTION ENCOUNTER (OUTPATIENT)
Age: 45
End: 2025-05-02

## 2025-06-17 ENCOUNTER — TRANSCRIPTION ENCOUNTER (OUTPATIENT)
Age: 45
End: 2025-06-17

## 2025-06-17 ENCOUNTER — APPOINTMENT (OUTPATIENT)
Dept: PSYCHIATRY | Facility: CLINIC | Age: 45
End: 2025-06-17

## 2025-07-08 ENCOUNTER — NON-APPOINTMENT (OUTPATIENT)
Age: 45
End: 2025-07-08

## 2025-07-10 ENCOUNTER — APPOINTMENT (OUTPATIENT)
Dept: OBGYN | Facility: CLINIC | Age: 45
End: 2025-07-10
Payer: COMMERCIAL

## 2025-07-10 VITALS
TEMPERATURE: 97 F | WEIGHT: 163 LBS | SYSTOLIC BLOOD PRESSURE: 118 MMHG | OXYGEN SATURATION: 98 % | HEART RATE: 78 BPM | DIASTOLIC BLOOD PRESSURE: 66 MMHG | HEIGHT: 66 IN | BODY MASS INDEX: 26.2 KG/M2

## 2025-07-10 LAB
HCG UR QL: NEGATIVE
QUALITY CONTROL: YES

## 2025-07-10 PROCEDURE — 99396 PREV VISIT EST AGE 40-64: CPT

## 2025-07-12 LAB — HPV HIGH+LOW RISK DNA PNL CVX: NOT DETECTED

## 2025-07-16 LAB — CYTOLOGY CVX/VAG DOC THIN PREP: NORMAL

## 2025-09-19 ENCOUNTER — RESULT REVIEW (OUTPATIENT)
Age: 45
End: 2025-09-19

## 2025-09-19 ENCOUNTER — APPOINTMENT (OUTPATIENT)
Dept: ULTRASOUND IMAGING | Facility: CLINIC | Age: 45
End: 2025-09-19
Payer: COMMERCIAL

## 2025-09-19 ENCOUNTER — APPOINTMENT (OUTPATIENT)
Dept: MAMMOGRAPHY | Facility: CLINIC | Age: 45
End: 2025-09-19
Payer: COMMERCIAL

## 2025-09-19 PROCEDURE — 77067 SCR MAMMO BI INCL CAD: CPT | Mod: 26

## 2025-09-19 PROCEDURE — 76641 ULTRASOUND BREAST COMPLETE: CPT | Mod: 26,50

## 2025-09-19 PROCEDURE — 77063 BREAST TOMOSYNTHESIS BI: CPT | Mod: 26

## 2025-09-23 PROBLEM — R93.89 IMAGING ABNORMALITIES: Status: ACTIVE | Noted: 2025-09-23
